# Patient Record
Sex: MALE | Race: WHITE | NOT HISPANIC OR LATINO | Employment: OTHER | RURAL
[De-identification: names, ages, dates, MRNs, and addresses within clinical notes are randomized per-mention and may not be internally consistent; named-entity substitution may affect disease eponyms.]

---

## 2017-03-17 ENCOUNTER — OFFICE VISIT (OUTPATIENT)
Dept: CARDIOLOGY | Facility: CLINIC | Age: 67
End: 2017-03-17

## 2017-03-17 VITALS
BODY MASS INDEX: 29.4 KG/M2 | HEIGHT: 68 IN | WEIGHT: 194 LBS | DIASTOLIC BLOOD PRESSURE: 70 MMHG | SYSTOLIC BLOOD PRESSURE: 116 MMHG | HEART RATE: 66 BPM

## 2017-03-17 DIAGNOSIS — E78.00 HYPERCHOLESTEREMIA: ICD-10-CM

## 2017-03-17 DIAGNOSIS — I25.10 CORONARY ARTERY DISEASE INVOLVING NATIVE CORONARY ARTERY OF NATIVE HEART WITHOUT ANGINA PECTORIS: Primary | ICD-10-CM

## 2017-03-17 DIAGNOSIS — I10 BENIGN HYPERTENSION: ICD-10-CM

## 2017-03-17 PROCEDURE — 99213 OFFICE O/P EST LOW 20 MIN: CPT | Performed by: PHYSICIAN ASSISTANT

## 2017-03-17 NOTE — PROGRESS NOTES
Subjective:   Jhonathan Garnica  1950  [phone]      03/17/2017    [facility] CARDIOLOGY    Gustavo Fairchild MD   [place] [id number]    REFERRING DOCTOR:        Patient ID: Jhonathan Garnica is a 66 y.o. male.    Chief Complaint:   Chief Complaint   Patient presents with   • Follow-up     Problem List:    1. Coronary artery disease:  a. History of remote angioplasty of the right coronary artery in, 1995, Dr. Coello.   b. Abnormal EKG, 02/02/2011, resulting in stress test and echocardiogram, 02/04/2011: Ejection fraction 65% to 70% with mild mitral regurgitation, mild tricuspid regurgitation, mild aortic insufficiency with left atrium of 3.4 cm.   c. Stress test revealing severe reversible defect of the inferior wall, normal left ventricular function.   d. Stress test in December 2015: Negative for ischemia with only 6 deficits noted. Normal ejection fraction.   2. Exertional dyspnea.   3. Benign hypertension.  4. Hypercholesterolemia.   5. Chronic back pain.   6. Ongoing tobacco abuse.   7. Hyperglycemia.   8. Family history of heart disease.   9. Lyme disease.   10. Surgical history:   a. Back surgery, 2005.     Allergies   Allergen Reactions   • Other        IVP DYES, causing whelps.         Current Outpatient Prescriptions:   •  aspirin 81 MG EC tablet, Take 81 mg by mouth daily., Disp: , Rfl:   •  atenolol (TENORMIN) 50 MG tablet, Take 50 mg by mouth daily., Disp: , Rfl:   •  buPROPion (WELLBUTRIN) 100 MG tablet, Take 100 mg by mouth 2 (two) times a day., Disp: , Rfl:   •  busPIRone (BUSPAR) 15 MG tablet, Take 15 mg by mouth 3 (three) times a day., Disp: , Rfl:   •  isosorbide mononitrate (IMDUR) 60 MG 24 hr tablet, Take 60 mg by mouth daily., Disp: , Rfl:   •  Multiple Vitamins-Minerals (MULTIVITAMIN ADULT PO), Take  by mouth daily., Disp: , Rfl:   •  nitroglycerin (NITROSTAT) 0.4 MG SL tablet, Place 0.4 mg under the tongue as needed for chest pain.  "Take no more than 3 doses in 15 minutes., Disp: , Rfl:   •  oxyCODONE-acetaminophen (PERCOCET)  MG per tablet, Take 1 tablet by mouth every 6 (six) hours as needed for moderate pain (4-6)., Disp: , Rfl:   •  rOPINIRole (REQUIP) 2 MG tablet, Take 2 mg by mouth Every Night., Disp: , Rfl: 3  •  rosuvastatin (CRESTOR) 10 MG tablet, Take 10 mg by mouth daily., Disp: , Rfl:   •  zolpidem (AMBIEN) 10 MG tablet, Take 10 mg by mouth At Night As Needed., Disp: , Rfl: 3    History of Present Illness    Patient presents today for further follow-up and management of his CAD, HTN, and HLD. Overall he has been doing well from a cardiac standpoint. No anginal symptoms. Stopping crestor did not help his restless legs.     No issues with chest pain, shortness of breath, fevers, chills, night sweats, PND, orthopnea or palpitations. No recent ER visits or hospital stays.      The following portions of the patient's history were reviewed and updated as appropriate: allergies, current medications, past family history, past medical history, past social history, past surgical history and problem list.    ROS   14 point ROS negative except as outlined in problem list, HPI and other parts of the note.    Procedures       Objective:       Vitals:    03/17/17 1030   BP: 116/70   BP Location: Left arm   Pulse: 66   Weight: 194 lb (88 kg)   Height: 68\" (172.7 cm)       GENERAL: Well-developed, well-nourished patient in no acute distress.  HEENT: Normocephalic, atraumatic, PERRLA. Moist mucous membranes.  NECK: No JVD present at 30°. No carotid bruits auscultated.  LUNGS: Clear to auscultation.  CARDIOVASCULAR: Heart has a regular rate and rhythm. No murmurs, gallops or rubs noted.   ABDOMEN: Soft, nontender. Positive bowel sounds.  MUSCULOSKELETAL: No gross deformities. No clubbing, cyanosis, or lower extremity edema.  SKIN: Pink, warm  Neuro: Nonfocal exam. Gait intact  Ext: No edema or bruising    The patient's old records including " ambulatory rhythm recordings (ECGs, Holter/event monitor) were reviewed and discussed.      Lab Review:   No results found for this or any previous visit.        Diagnosis:   1. Coronary artery disease involving native coronary artery of native heart without angina pectoris    2. Benign hypertension    3. Hypercholesteremia  Assessment & Plan:     1. CAD, stable with no anginal symptoms. Continue ASA, statin, imdur, and atenolol.     2. HTN, controlled. Continue current meds    3. Dyslipidemia, continue statin.     4. Follow-up in 6 months.        CARISSA Dubose  03/17/17  11:11 AM      EMR Dragon/Transcription disclaimer:  Much of this encounter note is an electronic transcription/translation of spoken language to printed text. Electronic translation of spoken language may permit erroneous, or at times, nonsensical words or phrases to be inadvertently transcribed. Although I have reviewed the note for such errors, some may still exist.

## 2017-08-11 RX ORDER — ATENOLOL 50 MG/1
50 TABLET ORAL DAILY
Qty: 30 TABLET | Refills: 11 | Status: SHIPPED | OUTPATIENT
Start: 2017-08-11 | End: 2019-07-18 | Stop reason: SDUPTHER

## 2017-10-20 ENCOUNTER — OFFICE VISIT (OUTPATIENT)
Dept: CARDIOLOGY | Facility: CLINIC | Age: 67
End: 2017-10-20

## 2017-10-20 VITALS
SYSTOLIC BLOOD PRESSURE: 148 MMHG | BODY MASS INDEX: 32.02 KG/M2 | DIASTOLIC BLOOD PRESSURE: 84 MMHG | HEIGHT: 67 IN | WEIGHT: 204 LBS

## 2017-10-20 DIAGNOSIS — I25.10 CORONARY ARTERY DISEASE INVOLVING NATIVE CORONARY ARTERY OF NATIVE HEART WITHOUT ANGINA PECTORIS: Primary | ICD-10-CM

## 2017-10-20 DIAGNOSIS — I10 BENIGN HYPERTENSION: ICD-10-CM

## 2017-10-20 PROCEDURE — 99213 OFFICE O/P EST LOW 20 MIN: CPT | Performed by: INTERNAL MEDICINE

## 2017-10-20 NOTE — PROGRESS NOTES
Subjective:   Jhonathan Garnica  1950  [phone]      10/20/2017    [facility] CARDIOLOGY    Gustavo Fairchild MD  [address] [id number]    REFERRING DOCTOR:       Patient ID: Jhonathan Garnica is a 66 y.o. male.    Chief Complaint:   Chief Complaint   Patient presents with   • Follow-up     Problem List:    1. Coronary artery disease:  a. History of remote angioplasty of the right coronary artery in, 1995, Dr. Coello.   b. Abnormal EKG, 02/02/2011, resulting in stress test and echocardiogram, 02/04/2011: Ejection fraction 65% to 70% with mild mitral regurgitation, mild tricuspid regurgitation, mild aortic insufficiency with left atrium of 3.4 cm.   c. Stress test revealing severe reversible defect of the inferior wall, normal left ventricular function.   d. Stress test in December 2015: Negative for ischemia with only 6 deficits noted. Normal ejection fraction.   2. Exertional dyspnea.   3. Benign hypertension.  4. Hypercholesterolemia.   5. Chronic back pain.   6. Ongoing tobacco abuse.   7. Hyperglycemia.   8. Family history of heart disease.   9. Lyme disease.   10. Surgical history:   a. Back surgery, 2005.     Allergies   Allergen Reactions   • Other        IVP DYES, causing whelps.         Current Outpatient Prescriptions:   •  aspirin 81 MG EC tablet, Take 81 mg by mouth daily., Disp: , Rfl:   •  atenolol (TENORMIN) 50 MG tablet, Take 1 tablet by mouth Daily., Disp: 30 tablet, Rfl: 11  •  buPROPion (WELLBUTRIN) 100 MG tablet, Take 100 mg by mouth 2 (two) times a day., Disp: , Rfl:   •  busPIRone (BUSPAR) 15 MG tablet, Take 15 mg by mouth 3 (three) times a day., Disp: , Rfl:   •  isosorbide mononitrate (IMDUR) 60 MG 24 hr tablet, Take 60 mg by mouth daily., Disp: , Rfl:   •  Multiple Vitamins-Minerals (MULTIVITAMIN ADULT PO), Take  by mouth daily., Disp: , Rfl:   •  nitroglycerin (NITROSTAT) 0.4 MG SL tablet, Place 0.4 mg under the tongue as needed  "for chest pain. Take no more than 3 doses in 15 minutes., Disp: , Rfl:   •  oxyCODONE-acetaminophen (PERCOCET)  MG per tablet, Take 1 tablet by mouth every 6 (six) hours as needed for moderate pain (4-6)., Disp: , Rfl:   •  rOPINIRole (REQUIP) 2 MG tablet, Take 2 mg by mouth Every Night., Disp: , Rfl: 3  •  rosuvastatin (CRESTOR) 10 MG tablet, Take 10 mg by mouth daily., Disp: , Rfl:   •  zolpidem (AMBIEN) 10 MG tablet, Take 10 mg by mouth At Night As Needed., Disp: , Rfl: 3    History of Present Illness    Patient presents for follow-up of his CAD and HTN. He has not had any anginal symptoms. Main complaint is arthritis and back pain. He still continues to smoke.   No issues with chest pain, shortness of breath, fevers, chills, night sweats, PND, orthopnea or palpitations. No recent ER visits or hospital stays.      The following portions of the patient's history were reviewed and updated as appropriate: allergies, current medications, past family history, past medical history, past social history, past surgical history and problem list.    ROS   14 point ROS negative except as outlined in problem list, HPI and other parts of the note.    Procedures       Objective:       Vitals:    10/20/17 1006   BP: 148/84   BP Location: Left arm   Patient Position: Sitting   Weight: 204 lb (92.5 kg)   Height: 67\" (170.2 cm)       GENERAL: Well-developed, well-nourished patient in no acute distress.  HEENT: Normocephalic, atraumatic, PERRLA. Moist mucous membranes.  NECK: No JVD present at 30°. No carotid bruits auscultated.  LUNGS: Clear to auscultation.  CARDIOVASCULAR: Heart has a regular rate and rhythm. No murmurs, gallops or rubs noted.   SKIN: Pink, warm  Neuro: Nonfocal exam. Gait intact  Ext: No edema or bruising. Pedal pulses 2+    The patient's old records including ambulatory rhythm recordings (ECGs, Holter/event monitor) were reviewed and discussed.      Lab Review:   No results found for this or any previous " visit.        Diagnosis:   1. Coronary artery disease involving native coronary artery of native heart without angina pectoris    2. Benign hypertension    Assessment & Plan:     1. CAD, stable with no anginal symptoms. Continue atenolol, imdur, crestor, ASA     2. HTN, mildly elevated. Monitor for now. SBP usually 115-120 range.     3. Follow-up in 6 months.        CARISSA Dubose  10/20/17  10:34 AM

## 2019-07-18 RX ORDER — ATENOLOL 50 MG/1
50 TABLET ORAL DAILY
Qty: 30 TABLET | Refills: 0 | Status: SHIPPED | OUTPATIENT
Start: 2019-07-18 | End: 2019-07-22 | Stop reason: SDUPTHER

## 2019-07-22 RX ORDER — ISOSORBIDE MONONITRATE 60 MG/1
60 TABLET, EXTENDED RELEASE ORAL DAILY
Qty: 90 TABLET | Refills: 0 | Status: SHIPPED | OUTPATIENT
Start: 2019-07-22 | End: 2019-10-21 | Stop reason: SDUPTHER

## 2019-07-22 RX ORDER — ATENOLOL 50 MG/1
50 TABLET ORAL DAILY
Qty: 90 TABLET | Refills: 0 | Status: SHIPPED | OUTPATIENT
Start: 2019-07-22 | End: 2019-10-21 | Stop reason: SDUPTHER

## 2019-09-19 NOTE — PROGRESS NOTES
[facility]   OFFICE NOTE      Jhonathan Garnica  1950  PCP: Gustavo Fairchild MD    SUBJECTIVE:   Jhonathan Garnica is a 68 y.o. male seen for a follow up visit regarding the following:     CC:CAD    HPI:   Pleasant 68-year-old gentleman returns today follow-up regarding coronary disease, hypertension, dyslipidemia.  Since last office visit he has had lab work which will be faxed to our office regarding his dyslipidemia.  He states on current medical therapy including atenolol his blood pressure control.  He states his cholesterols been controlled.  He denies any chest pain or chest tightness suggesting angina pectoris.  He denies any dizziness, near-syncope or syncope.  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  He checks in with his family physician Dr. Fairchild every few months.    Cardiac PMH: (Old records have been reviewed and summarized below)  1. Coronary artery disease:  a. History of remote angioplasty of the right coronary artery in, 1995, Dr. Coello.   b. Abnormal EKG, 02/02/2011, resulting in stress test and echocardiogram, 02/04/2011: Ejection fraction 65% to 70% with mild mitral regurgitation, mild tricuspid regurgitation, mild aortic insufficiency with left atrium of 3.4 cm.   c. Stress test revealing severe reversible defect of the inferior wall, normal left ventricular function.   d. Stress test in December 2015: Negative for ischemia with only 6 deficits noted. Normal ejection fraction.   2. Exertional dyspnea.   3. Benign hypertension.  4. Hypercholesterolemia.   5. Chronic back pain.   6. Ongoing tobacco abuse.   7. Hyperglycemia.   8. Family history of heart disease.   a.     Past Medical History, Past Surgical History, Family history, Social History, and Medications were all reviewed with the patient today and updated as necessary.       Current Outpatient Medications:   •  aspirin 81 MG EC tablet, Take 81 mg by mouth daily., Disp: ,  Rfl:   •  atenolol (TENORMIN) 50 MG tablet, Take 1 tablet by mouth Daily. Must make appointment for further refills., Disp: 90 tablet, Rfl: 0  •  ibuprofen (ADVIL,MOTRIN) 800 MG tablet, Take 800 mg by mouth Every 6 (Six) Hours As Needed for Mild Pain ., Disp: , Rfl:   •  isosorbide mononitrate (IMDUR) 60 MG 24 hr tablet, Take 1 tablet by mouth Daily., Disp: 90 tablet, Rfl: 0  •  Multiple Vitamins-Minerals (MULTIVITAMIN ADULT PO), Take  by mouth daily., Disp: , Rfl:   •  nitroglycerin (NITROSTAT) 0.4 MG SL tablet, Place 0.4 mg under the tongue as needed for chest pain. Take no more than 3 doses in 15 minutes., Disp: , Rfl:   •  oxyCODONE-acetaminophen (PERCOCET)  MG per tablet, Take 1 tablet by mouth every 6 (six) hours as needed for moderate pain (4-6)., Disp: , Rfl:   •  rOPINIRole (REQUIP) 2 MG tablet, Take 2 mg by mouth Every Night., Disp: , Rfl: 3  •  rosuvastatin (CRESTOR) 10 MG tablet, Take 10 mg by mouth daily., Disp: , Rfl:       Allergies   Allergen Reactions   • Other        IVP DYES, causing whelps.       Patient Active Problem List   Diagnosis   • Coronary artery disease   • Exertional dyspnea   • Benign hypertension   • Hypercholesteremia   • Chronic back pain   • Tobacco abuse   • Hyperglycemia   • Family history of heart disease   • Lyme disease   • Gastroesophageal reflux disease   • Spondylolisthesis, lumbar region     Past Medical History:   Diagnosis Date   • Abnormal EKG 02/02/2011    Abnormal EKG, 02/02/2011, resulting in stress test and echocardiogram, 02/04/2011:  Ejection fraction 65% to 70% with mild mitral regurgitation, mild tricuspid regurgitation, mild aortic insufficiency with left atrium of 3.4 cm.    • Benign hypertension 9/12/2016   • Chronic back pain 9/12/2016   • Coronary artery disease 9/12/2016   • Exertional dyspnea 9/12/2016   • Family history of heart disease 9/12/2016   • Gastroesophageal reflux disease 9/12/2016   • Hypercholesteremia 9/12/2016   • Hyperglycemia  "9/12/2016   • Lyme disease 9/12/2016     Past Surgical History:   Procedure Laterality Date   • ANGIOPLASTY  1995    History of remote angioplasty of the right coronary artery in, 1995, Dr. Coello.    • BACK SURGERY  2005     History reviewed. No pertinent family history.  Social History     Tobacco Use   • Smoking status: Former Smoker     Types: Cigarettes     Last attempt to quit: 9/20/2009     Years since quitting: 10.0   • Smokeless tobacco: Never Used   Substance Use Topics   • Alcohol use: Yes     Comment: rare       ROS:  Review of Symptoms:  General: no recent weight loss/gain, weakness or fatigue  Skin: no rashes, lumps, or other skin changes  HEENT: no dizziness, lightheadedness, or vision changes  Respiratory: no cough or hemoptysis  Cardiovascular: no palpitations, and tachycardia  Gastrointestinal: no black/tarry stools or diarrhea  Urinary: no change in frequency or urgency  Peripheral Vascular: no claudication or leg cramps  Musculoskeletal: OA, back pain  Psychiatric: no depression or excessive stress  Neurological: no sensory or motor loss, no syncope  Hematologic: no anemia, easy bruising or bleeding  Endocrine: no thyroid problems, nor heat or cold intolerance    PHYSICAL EXAM:    /66 (BP Location: Left arm, Patient Position: Sitting)   Pulse 60   Ht 172.7 cm (68\")   Wt 83.5 kg (184 lb)   SpO2 95%   BMI 27.98 kg/m²        Wt Readings from Last 5 Encounters:   09/20/19 83.5 kg (184 lb)   06/15/18 88.9 kg (196 lb)   10/20/17 92.5 kg (204 lb)   03/17/17 88 kg (194 lb)   12/08/16 86.2 kg (190 lb)       BP Readings from Last 5 Encounters:   09/20/19 114/66   06/15/18 110/60   10/20/17 148/84   03/17/17 116/70   09/16/16 126/74       General appearance - Alert, well appearing, and in no distress   Mental status - Affect appropriate to mood.  Eyes - Sclerae anicteric,  ENMT - Hearing grossly normal bilaterally, Dental hygiene good.  Neck - Carotids upstroke normal bilaterally, no bruits, no " JVD.  Resp - Clear to auscultation, no wheezes, rales or rhonchi, symmetric air entry.  Heart - Normal rate, regular rhythm, normal S1, S2, no murmurs, rubs, clicks or gallops.  GI - Soft, nontender, nondistended, no masses or organomegaly.  Neurological - Grossly intact - normal speech, no focal findings  Musculoskeletal - No joint tenderness, deformity or swelling, no muscular tenderness noted.  Extremities - Peripheral pulses normal, no pedal edema, no clubbing or cyanosis.  Skin - Normal coloration and turgor.  Psych -  oriented to person, place, and time.    Medical problems and test results were reviewed with the patient today.     No results found for this or any previous visit (from the past 672 hour(s)).      EKG: (EKG has been independently visualized by me and summarized below)    ECG 12 Lead  Date/Time: 9/20/2019 10:15 AM  Performed by: Michael Sutton PA  Authorized by: Michael Sutton PA   Comparison: not compared with previous ECG   Rhythm: sinus rhythm  Rate: normal  Conduction: conduction normal  Conduction: 1st degree AV block  QRS axis: normal    Clinical impression: normal ECG            ASSESSMENT   1. CAD: Remote angioplasty with Dr. Coello.  He has had no recurrent symptoms suggesting angina pectoris.  2. HTN: Controlled on atenolol therapy.  Continue reducing sodium and exercise  3. HLD:   Statin follow-up lipid panel and CMP.    PLAN  · Continue current medical therapy  · Return for follow-up in 1 year or sooner as needed.    9/20/2019  10:16 AM    Will Lesley LUKE

## 2019-09-20 ENCOUNTER — OFFICE VISIT (OUTPATIENT)
Dept: CARDIOLOGY | Facility: CLINIC | Age: 69
End: 2019-09-20

## 2019-09-20 VITALS
HEART RATE: 60 BPM | BODY MASS INDEX: 27.89 KG/M2 | HEIGHT: 68 IN | WEIGHT: 184 LBS | SYSTOLIC BLOOD PRESSURE: 114 MMHG | DIASTOLIC BLOOD PRESSURE: 66 MMHG | OXYGEN SATURATION: 95 %

## 2019-09-20 DIAGNOSIS — I10 BENIGN HYPERTENSION: ICD-10-CM

## 2019-09-20 DIAGNOSIS — E78.00 HYPERCHOLESTEREMIA: ICD-10-CM

## 2019-09-20 DIAGNOSIS — I25.10 CORONARY ARTERY DISEASE INVOLVING NATIVE CORONARY ARTERY OF NATIVE HEART WITHOUT ANGINA PECTORIS: Primary | ICD-10-CM

## 2019-09-20 PROCEDURE — 99213 OFFICE O/P EST LOW 20 MIN: CPT | Performed by: PHYSICIAN ASSISTANT

## 2019-09-20 PROCEDURE — 93000 ELECTROCARDIOGRAM COMPLETE: CPT | Performed by: PHYSICIAN ASSISTANT

## 2019-10-21 RX ORDER — ISOSORBIDE MONONITRATE 60 MG/1
60 TABLET, EXTENDED RELEASE ORAL DAILY
Qty: 90 TABLET | Refills: 3 | Status: SHIPPED | OUTPATIENT
Start: 2019-10-21

## 2019-10-21 RX ORDER — ATENOLOL 50 MG/1
50 TABLET ORAL DAILY
Qty: 90 TABLET | Refills: 3 | Status: SHIPPED | OUTPATIENT
Start: 2019-10-21